# Patient Record
Sex: FEMALE | Race: BLACK OR AFRICAN AMERICAN
[De-identification: names, ages, dates, MRNs, and addresses within clinical notes are randomized per-mention and may not be internally consistent; named-entity substitution may affect disease eponyms.]

---

## 2018-01-07 ENCOUNTER — HOSPITAL ENCOUNTER (EMERGENCY)
Dept: HOSPITAL 61 - ER | Age: 48
Discharge: HOME | End: 2018-01-07
Payer: MEDICARE

## 2018-01-07 DIAGNOSIS — H92.03: ICD-10-CM

## 2018-01-07 DIAGNOSIS — Z98.51: ICD-10-CM

## 2018-01-07 DIAGNOSIS — J02.9: Primary | ICD-10-CM

## 2018-01-07 DIAGNOSIS — Z90.710: ICD-10-CM

## 2018-01-07 PROCEDURE — 87880 STREP A ASSAY W/OPTIC: CPT

## 2018-01-07 PROCEDURE — 96372 THER/PROPH/DIAG INJ SC/IM: CPT

## 2018-01-07 PROCEDURE — 99283 EMERGENCY DEPT VISIT LOW MDM: CPT

## 2018-01-07 PROCEDURE — 87070 CULTURE OTHR SPECIMN AEROBIC: CPT

## 2018-01-07 RX ADMIN — PENICILLIN G BENZATHINE 1 UNIT: 1200000 INJECTION, SUSPENSION INTRAMUSCULAR at 18:15

## 2018-01-08 LAB
NEGATIVE OBC STREP: (no result)
POSITIVE OBC STREP: (no result)

## 2020-06-01 ENCOUNTER — HOSPITAL ENCOUNTER (EMERGENCY)
Dept: HOSPITAL 61 - ER | Age: 50
Discharge: HOME | End: 2020-06-01
Payer: MEDICARE

## 2020-06-01 VITALS — HEIGHT: 68 IN | BODY MASS INDEX: 37.96 KG/M2 | WEIGHT: 250.45 LBS

## 2020-06-01 VITALS — DIASTOLIC BLOOD PRESSURE: 72 MMHG | SYSTOLIC BLOOD PRESSURE: 149 MMHG

## 2020-06-01 DIAGNOSIS — I10: ICD-10-CM

## 2020-06-01 DIAGNOSIS — R07.1: Primary | ICD-10-CM

## 2020-06-01 DIAGNOSIS — E11.9: ICD-10-CM

## 2020-06-01 LAB
ALBUMIN SERPL-MCNC: 3.1 G/DL (ref 3.4–5)
ALBUMIN/GLOB SERPL: 0.7 {RATIO} (ref 1–1.7)
ALP SERPL-CCNC: 83 U/L (ref 46–116)
ALT SERPL-CCNC: 18 U/L (ref 14–59)
AMPHETAMINE/METHAMPHETAMINE: (no result)
ANION GAP SERPL CALC-SCNC: 8 MMOL/L (ref 6–14)
APTT PPP: YELLOW S
AST SERPL-CCNC: 7 U/L (ref 15–37)
BACTERIA #/AREA URNS HPF: (no result) /HPF
BARBITURATES UR-MCNC: (no result) UG/ML
BASOPHILS # BLD AUTO: 0 X10^3/UL (ref 0–0.2)
BASOPHILS NFR BLD: 0 % (ref 0–3)
BENZODIAZ UR-MCNC: (no result) UG/L
BILIRUB SERPL-MCNC: 0.2 MG/DL (ref 0.2–1)
BILIRUB UR QL STRIP: NEGATIVE
BUN SERPL-MCNC: 10 MG/DL (ref 7–20)
BUN/CREAT SERPL: 13 (ref 6–20)
CALCIUM SERPL-MCNC: 8.7 MG/DL (ref 8.5–10.1)
CANNABINOIDS UR-MCNC: (no result) UG/L
CHLORIDE SERPL-SCNC: 99 MMOL/L (ref 98–107)
CO2 SERPL-SCNC: 28 MMOL/L (ref 21–32)
COCAINE UR-MCNC: (no result) NG/ML
CREAT SERPL-MCNC: 0.8 MG/DL (ref 0.6–1)
EOSINOPHIL NFR BLD: 0.1 X10^3/UL (ref 0–0.7)
EOSINOPHIL NFR BLD: 1 % (ref 0–3)
ERYTHROCYTE [DISTWIDTH] IN BLOOD BY AUTOMATED COUNT: 14.4 % (ref 11.5–14.5)
FIBRINOGEN PPP-MCNC: CLEAR MG/DL
GFR SERPLBLD BASED ON 1.73 SQ M-ARVRAT: 92.6 ML/MIN
GLOBULIN SER-MCNC: 4.3 G/DL (ref 2.2–3.8)
GLUCOSE SERPL-MCNC: 335 MG/DL (ref 70–99)
HCT VFR BLD CALC: 40.3 % (ref 36–47)
HGB BLD-MCNC: 13.6 G/DL (ref 12–15.5)
LIPASE: 49 U/L (ref 73–393)
LYMPHOCYTES # BLD: 1.4 X10^3/UL (ref 1–4.8)
LYMPHOCYTES NFR BLD AUTO: 14 % (ref 24–48)
MCH RBC QN AUTO: 29 PG (ref 25–35)
MCHC RBC AUTO-ENTMCNC: 34 G/DL (ref 31–37)
MCV RBC AUTO: 86 FL (ref 79–100)
METHADONE SERPL-MCNC: (no result) NG/ML
MONO #: 0.6 X10^3/UL (ref 0–1.1)
MONOCYTES NFR BLD: 6 % (ref 0–9)
NEUT #: 7.6 X10^3/UL (ref 1.8–7.7)
NEUTROPHILS NFR BLD AUTO: 78 % (ref 31–73)
NITRITE UR QL STRIP: NEGATIVE
OPIATES UR-MCNC: (no result) NG/ML
PCP SERPL-MCNC: (no result) MG/DL
PH UR STRIP: 7 [PH]
PLATELET # BLD AUTO: 222 X10^3/UL (ref 140–400)
POTASSIUM SERPL-SCNC: 4 MMOL/L (ref 3.5–5.1)
PROT SERPL-MCNC: 7.4 G/DL (ref 6.4–8.2)
PROT UR STRIP-MCNC: NEGATIVE MG/DL
PROTHROMBIN TIME: 11.8 SEC (ref 11.7–14)
RBC # BLD AUTO: 4.69 X10^6/UL (ref 3.5–5.4)
RBC #/AREA URNS HPF: 0 /HPF (ref 0–2)
SODIUM SERPL-SCNC: 135 MMOL/L (ref 136–145)
SQUAMOUS #/AREA URNS LPF: (no result) /LPF
UROBILINOGEN UR-MCNC: 1 MG/DL
WBC # BLD AUTO: 9.8 X10^3/UL (ref 4–11)
WBC #/AREA URNS HPF: (no result) /HPF (ref 0–4)

## 2020-06-01 PROCEDURE — 83690 ASSAY OF LIPASE: CPT

## 2020-06-01 PROCEDURE — 83880 ASSAY OF NATRIURETIC PEPTIDE: CPT

## 2020-06-01 PROCEDURE — 80307 DRUG TEST PRSMV CHEM ANLYZR: CPT

## 2020-06-01 PROCEDURE — 99285 EMERGENCY DEPT VISIT HI MDM: CPT

## 2020-06-01 PROCEDURE — 84484 ASSAY OF TROPONIN QUANT: CPT

## 2020-06-01 PROCEDURE — 80053 COMPREHEN METABOLIC PANEL: CPT

## 2020-06-01 PROCEDURE — 93005 ELECTROCARDIOGRAM TRACING: CPT

## 2020-06-01 PROCEDURE — 85379 FIBRIN DEGRADATION QUANT: CPT

## 2020-06-01 PROCEDURE — 85025 COMPLETE CBC W/AUTO DIFF WBC: CPT

## 2020-06-01 PROCEDURE — 81001 URINALYSIS AUTO W/SCOPE: CPT

## 2020-06-01 PROCEDURE — 87086 URINE CULTURE/COLONY COUNT: CPT

## 2020-06-01 PROCEDURE — 85610 PROTHROMBIN TIME: CPT

## 2020-06-01 PROCEDURE — 36415 COLL VENOUS BLD VENIPUNCTURE: CPT

## 2020-06-01 PROCEDURE — 71046 X-RAY EXAM CHEST 2 VIEWS: CPT

## 2020-06-01 PROCEDURE — 96374 THER/PROPH/DIAG INJ IV PUSH: CPT

## 2020-06-01 NOTE — EKG
[facility]

              [address] [place] [address]

Test Date:    2020               Test Time:    15:36:30

Pat Name:     THERESA HERANNDEZ     Department:   

Patient ID:   [id number]           Room:          

Gender:       F                        Technician:   

:          1970               Requested By: CASSIDY ESCAMILLA

Order Number: [id number]           Reading MD:   Dhruv Prado

                                 Measurements

Intervals                              Axis          

Rate:         87                       P:            39

WY:           154                      QRS:          -1

QRSD:         88                       T:            23

QT:           346                                    

QTc:          422                                    

                           Interpretive Statements

SINUS RHYTHM

LEFT ATRIAL ABNORMALITY

LEFTWARD AXIS

ABNORMAL ECG



Electronically Signed On 2020 12:03:12 CDT by Dhruv Prado

## 2020-06-01 NOTE — RAD
EXAM: CHEST PA   LATERAL

 

INDICATION: Reason: chest pain / Spl. Instructions:  / History: .

 

TECHNIQUE: PA and lateral views

 

COMPARISON: 6/10/2014 chest x-ray

 

FINDINGS:

 

The heart size is normal.

 

The great vessels appear unremarkable.

 

There is no hilar or mediastinal mass.

 

The lungs are clear.

 

There is no pleural effusion or pneumothorax.

 

There are no significant osseous abnormalities.

 

IMPRESSION:

 

No active cardiopulmonary disease.

 

Electronically signed by: Jefry Sweeney MD (6/1/2020 5:26 PM) 

[id number]

## 2020-06-01 NOTE — PHYS DOC
Past Medical History


Past Medical History:  Diabetes-Type II, Hypertension, Other


Additional Past Medical Histor:  Detached retina; Visually impaired, SLEEP APNEA


Past Surgical History:  Hysterectomy, Tubal ligation, Other


Additional Past Surgical Histo:  Detached retina repair; Cauterization for 

vaginal bleeding, LEFT FOOT


Smoking Status:  Never Smoker


Alcohol Use:  Occasionally


Drug Use:  None





General Adult


EDM:


Chief Complaint:  CHEST PAIN





HPI:


HPI:





Patient is a 50  year old FEMALE who presents with recent travel to [place] and 

she has been lifting luggage and lifting grandchildren holding grandchildren.  

Saturday she began having mid to left-sided chest pain that is more so with 

movement or laughing or taking a deep breath.  She rates her pain a 5 out of 10.

 She states that the pain will get worse and then gets better.  She states she 

took a 325 Mark aspirin at 1300 today.  Patient has a history of diabetes, 

sleep apnea, hypertension, hysterectomy, detached retina.





Review of Systems:


Review of Systems:





Cardiovascular:   chest pain or denies edema. []





Heart Score:


HEART Score for Chest Pain:  








HEART Score for Chest Pain Response (Comments) Value


 


History Slighlty/Non-Suspicious 0


 


ECG Normal 0


 


Age >45 - < 65 1


 


Risk Factors                            1 or 2 Risk Factors 1


 


Troponin < Normal Limit 0


 


Total  2








Risk Factors:


Risk Factors:  DM, Current or recent (<one month) smoker, HTN, HLP, family 

history of CAD, obesity.


Risk Scores:


Score 0 - 3:  2.5% MACE over next 6 weeks - Discharge Home


Score 4 - 6:  20.3% MACE over next 6 weeks - Admit for Clinical Observation


Score 7 - 10:  72.7% MACE over next 6 weeks - Early Invasive Strategies





Current Medications:





Current Medications








 Medications


  (Trade)  Dose


 Ordered  Sig/[facility]  Start Time


 Stop Time Status Last Admin


Dose Admin


 


 Fentanyl Citrate


  (Fentanyl 2ml


 Vial)  50 mcg  1X  ONCE  20 16:45


 20 16:46 DC 20 16:47


50 MCG











Allergies:


Allergies:





Allergies








Coded Allergies Type Severity Reaction Last Updated Verified


 


  No Known Drug Allergies    6/10/14 No











Physical Exam:


PE:





Constitutional: Well developed, well nourished, no acute distress, non-toxic 

appearance. []


HENT: Normocephalic, atraumatic, bilateral external ears normal, oropharynx 

moist, no oral exudates, nose normal. []


Eyes: PERRLA, EOMI, conjunctiva normal, no discharge. [] 


Neck: Normal range of motion, no tenderness, supple, no stridor. [] 


Cardiovascular:Heart rate regular rhythm, no murmur, reproducible chest pain 

with movement of left arm, laughing.  []


Lungs & Thorax:  Bilateral breath sounds clear to auscultation []


Abdomen: Bowel sounds normal, soft, no tenderness, no masses, no pulsatile 

masses. [] 


Skin: Warm, dry, no erythema, no rash. [] 


Back: No tenderness, no CVA tenderness. [] 


Extremities: No tenderness, no cyanosis, no clubbing, ROM intact, no edema. [] 


Neurologic: Alert and oriented X 3, normal motor function, normal sensory 

function, no focal deficits noted. []


Psychologic: Affect normal, judgement normal, mood normal. []





Current Patient Data:


Labs:





                                Laboratory Tests








Test


 20


15:30 20


15:48 20


16:53


 


Urine Collection Type Void    


 


Urine Color Yellow    


 


Urine Clarity Clear    


 


Urine pH


 7.0 (<5.0-8.0)


 


 





 


Urine Specific Gravity


 >=1.030


(1.000-1.030) 


 





 


Urine Protein


 Negative mg/dL


(NEG-TRACE) 


 





 


Urine Glucose (UA)


 >=1000 mg/dL


(NEG) 


 





 


Urine Ketones (Stick)


 Negative mg/dL


(NEG) 


 





 


Urine Blood


 Negative (NEG)


 


 





 


Urine Nitrite


 Negative (NEG)


 


 





 


Urine Bilirubin


 Negative (NEG)


 


 





 


Urine Urobilinogen Dipstick


 1.0 mg/dL (0.2


mg/dL) 


 





 


Urine Leukocyte Esterase Small (NEG)    


 


Urine RBC 0 /HPF (0-2)    


 


Urine WBC


 1-4 /HPF (0-4)


 


 





 


Urine Squamous Epithelial


Cells Mod /LPF  


 


 





 


Urine Bacteria


 Few /HPF


(0-FEW) 


 





 


Urine Opiates Screen Neg (NEG)    


 


Urine Methadone Screen Neg (NEG)    


 


Urine Barbiturates Neg (NEG)    


 


Urine Phencyclidine Screen Neg (NEG)    


 


Urine


Amphetamine/Methamphetamine Neg (NEG)  


 


 





 


Urine Benzodiazepines Screen Neg (NEG)    


 


Urine Cocaine Screen Neg (NEG)    


 


Urine Cannabinoids Screen Neg (NEG)    


 


Urine Ethyl Alcohol Neg (NEG)    


 


White Blood Count


 


 9.8 x10^3/uL


(4.0-11.0) 





 


Red Blood Count


 


 4.69 x10^6/uL


(3.50-5.40) 





 


Hemoglobin


 


 13.6 g/dL


(12.0-15.5) 





 


Hematocrit


 


 40.3 %


(36.0-47.0) 





 


Mean Corpuscular Volume


 


 86 fL ()


 





 


Mean Corpuscular Hemoglobin  29 pg (25-35)   


 


Mean Corpuscular Hemoglobin


Concent 


 34 g/dL


(31-37) 





 


Red Cell Distribution Width


 


 14.4 %


(11.5-14.5) 





 


Platelet Count


 


 222 x10^3/uL


(140-400) 





 


Neutrophils (%) (Auto)  78 % (31-73)  H 


 


Lymphocytes (%) (Auto)  14 % (24-48)  L 


 


Monocytes (%) (Auto)  6 % (0-9)   


 


Eosinophils (%) (Auto)  1 % (0-3)   


 


Basophils (%) (Auto)  0 % (0-3)   


 


Neutrophils # (Auto)


 


 7.6 x10^3/uL


(1.8-7.7) 





 


Lymphocytes # (Auto)


 


 1.4 x10^3/uL


(1.0-4.8) 





 


Monocytes # (Auto)


 


 0.6 x10^3/uL


(0.0-1.1) 





 


Eosinophils # (Auto)


 


 0.1 x10^3/uL


(0.0-0.7) 





 


Basophils # (Auto)


 


 0.0 x10^3/uL


(0.0-0.2) 





 


Prothrombin Time


 


 


 11.8 SEC


(11.7-14.0)


 


Prothrombin Time INR   0.9 (0.8-1.1)  


 


D-Dimer (Taisha)


 


 


 0.43 ug/mlFEU


(0.00-0.50)


 


Sodium Level


 


 


 135 mmol/L


(136-145)  L


 


Potassium Level


 


 


 4.0 mmol/L


(3.5-5.1)


 


Chloride Level


 


 


 99 mmol/L


()


 


Carbon Dioxide Level


 


 


 28 mmol/L


(21-32)


 


Anion Gap   8 (6-14)  


 


Blood Urea Nitrogen


 


 


 10 mg/dL


(7-20)


 


Creatinine


 


 


 0.8 mg/dL


(0.6-1.0)


 


Estimated GFR


(Cockcroft-Gault) 


 


 92.6  





 


BUN/Creatinine Ratio   13 (6-20)  


 


Glucose Level


 


 


 335 mg/dL


(70-99)  H


 


Calcium Level


 


 


 8.7 mg/dL


(8.5-10.1)


 


Total Bilirubin


 


 


 0.2 mg/dL


(0.2-1.0)


 


Aspartate Amino Transferase


(AST) 


 


 7 U/L (15-37)


L


 


Alanine Aminotransferase (ALT)


 


 


 18 U/L (14-59)





 


Alkaline Phosphatase


 


 


 83 U/L


()


 


Troponin I Quantitative


 


 


 < 0.017 ng/mL


(0.000-0.055)


 


NT-Pro-B-Type Natriuretic


Peptide 


 


 73 pg/mL


(0-124)


 


Total Protein


 


 


 7.4 g/dL


(6.4-8.2)


 


Albumin


 


 


 3.1 g/dL


(3.4-5.0)  L


 


Albumin/Globulin Ratio


 


 


 0.7 (1.0-1.7)


L


 


Lipase


 


 


 49 U/L


()  L





                                Laboratory Tests


20 15:48








                                Laboratory Tests


20 16:53








Vital Signs:





                                   Vital Signs








  Date Time  Temp Pulse Resp B/P (MAP) Pulse Ox O2 Delivery O2 Flow Rate FiO2


 


20 16:47   18  99 Room Air  


 


20 15:32 98.2 91  144/76 (98)    





 98.2       











EKG:


EKG:


[]





Radiology/Procedures:


Radiology/Procedures:


[]


Impression:


                            [facility]


                    [address] [place] [address]


                                 [phone]


                                        


                                 IMAGING REPORT





                                     Signed





PATIENT: THERESA HERNANDEZ LACCOUNT: [id number]     MRN#: [id number]


: 1970           LOCATION: ER              AGE: 50


SEX: F                    EXAM DT: 20         ACCESSION#: [id number]


STATUS: REG ER            ORD. PHYSICIAN: CASSIDY ESCAMILLA


REASON: chest pain


PROCEDURE: CHEST PA & LATERAL





EXAM: CHEST PA   LATERAL


 


INDICATION: Reason: chest pain / Spl. Instructions:  / History: .


 


TECHNIQUE: PA and lateral views


 


COMPARISON: 6/10/2014 chest x-ray


 


FINDINGS:


 


The heart size is normal.


 


The great vessels appear unremarkable.


 


There is no hilar or mediastinal mass.


 


The lungs are clear.


 


There is no pleural effusion or pneumothorax.


 


There are no significant osseous abnormalities.


 


IMPRESSION:


 


No active cardiopulmonary disease.


 


Electronically signed by: Germaine Sweeney MD (2020 5:26 PM) 


[id number]














DICTATED and SIGNED BY:     GERMAINE SWEENEY MD


DATE:     20 1726





Course & Med Decision Making:


Course & Med Decision Making


Pertinent Labs and Imaging studies reviewed. (See chart for details)





No extremity swelling.  Alert and oriented.  Lungs are clear to auscultation all

 lobes.  Vital signs within normal limits.  EKG is sinus rhythm and no STEMI.  

Speaks in full clear sentences.  Ambulatory with a steady gait.  Denies any 

numbness or tingling, headache, dizziness, syncope, palpitations, shortness of 

breath, cough, fever, abdominal pain, nausea, vomiting, diarrhea, focal 

weakness, vision changes.  She denies been rounding by also is been sick.  Heart

 score is a 2.





Chest x-ray shows no acute findings.  Lab work shows no acute findings.  Due to 

the reproducible pain with movement and laughing this is most likely 

musculoskeletal pain. Patient states pain is good after Fentanyl given. 





[]





Dragon Disclaimer:


Dragon Disclaimer:


This electronic medical record was generated, in whole or in part, using a voice

 recognition dictation system.





Departure


Departure


Impression:  


   Primary Impression:  


   Costochondral chest pain


Disposition:   HOME, SELF-CARE


Condition:  STABLE


Referrals:  


ARUN DANIELLE MD (PCP)








SATISH PERDOMO MD


Patient Instructions:  Costochondritis, Easy-to-Read





Additional Instructions:  


Take medication as prescribed.  Follow-up with a cardiologist if needed.  If 

pain becomes severe he becomes very short of breath come back to the emergency 

room.


Scripts


Orphenadrine Citrate (ORPHENADRINE CITRATE) 100 Mg Tablet.er


1 TAB PO BID, #14 TAB


   Prov: CASSIDY ESCAMILLA APRN         20 


Hydrocodone/Apap 5-325 (NORCO 5-325 TABLET) 1 Each Tablet


1 TAB PO PRN Q6HRS PRN for PAIN, #8 TAB 0 Refills


   Prov: CASSIDY ESCAMILLA         20











CASSIDY ESCAMILLA             2020 18:08

## 2020-06-22 ENCOUNTER — HOSPITAL ENCOUNTER (EMERGENCY)
Dept: HOSPITAL 61 - ER | Age: 50
Discharge: HOME | End: 2020-06-22
Payer: MEDICARE

## 2020-06-22 VITALS — SYSTOLIC BLOOD PRESSURE: 198 MMHG | DIASTOLIC BLOOD PRESSURE: 94 MMHG

## 2020-06-22 VITALS — HEIGHT: 68 IN | BODY MASS INDEX: 37.96 KG/M2 | WEIGHT: 250.45 LBS

## 2020-06-22 DIAGNOSIS — E11.9: ICD-10-CM

## 2020-06-22 DIAGNOSIS — Z98.51: ICD-10-CM

## 2020-06-22 DIAGNOSIS — I10: ICD-10-CM

## 2020-06-22 DIAGNOSIS — N39.0: Primary | ICD-10-CM

## 2020-06-22 DIAGNOSIS — Z90.710: ICD-10-CM

## 2020-06-22 LAB
APTT PPP: YELLOW S
BACTERIA #/AREA URNS HPF: (no result) /HPF
BILIRUB UR QL STRIP: NEGATIVE
FIBRINOGEN PPP-MCNC: (no result) MG/DL
NITRITE UR QL STRIP: POSITIVE
PH UR STRIP: 5 [PH]
PROT UR STRIP-MCNC: 30 MG/DL
RBC #/AREA URNS HPF: (no result) /HPF (ref 0–2)
SQUAMOUS #/AREA URNS LPF: (no result) /LPF
UROBILINOGEN UR-MCNC: 0.2 MG/DL
WBC #/AREA URNS HPF: (no result) /HPF (ref 0–4)
YEAST #/AREA URNS HPF: PRESENT /HPF

## 2020-06-22 PROCEDURE — 81001 URINALYSIS AUTO W/SCOPE: CPT

## 2020-06-22 PROCEDURE — 99283 EMERGENCY DEPT VISIT LOW MDM: CPT

## 2020-06-22 PROCEDURE — 87086 URINE CULTURE/COLONY COUNT: CPT

## 2020-06-22 NOTE — PHYS DOC
Past Medical History


Past Medical History:  Diabetes-Type II, Hypertension, Other


Additional Past Medical Histor:  Detached retina; Visually impaired, SLEEP APNEA


Past Surgical History:  Hysterectomy, Tubal ligation, Other


Additional Past Surgical Histo:  Detached retina repair; Cauterization for 

vaginal bleeding, LEFT FOOT


Smoking Status:  Never Smoker


Alcohol Use:  Occasionally


Drug Use:  None





General Adult


EDM:


Chief Complaint:  PAIN ON URINATION





HPI:


HPI:





Patient is a 50  year old female who presents with 2 days of burning with 

urination and low back pain. Denies fever, nausea, vomiting, chest pain, soa, 

dizziness, headache, diarrhea, constipation, abdominal pain. Abdomen is soft and

nontender. Afebrile.





Review of Systems:


Review of Systems:





:  Denies dysuria. []





Heart Score:


Risk Factors:


Risk Factors:  DM, Current or recent (<one month) smoker, HTN, HLP, family 

history of CAD, obesity.


Risk Scores:


Score 0 - 3:  2.5% MACE over next 6 weeks - Discharge Home


Score 4 - 6:  20.3% MACE over next 6 weeks - Admit for Clinical Observation


Score 7 - 10:  72.7% MACE over next 6 weeks - Early Invasive Strategies





Allergies:


Allergies:





Allergies








Coded Allergies Type Severity Reaction Last Updated Verified


 


  No Known Drug Allergies    6/10/14 No











Physical Exam:


PE:





Constitutional: Well developed, well nourished, no acute distress, non-toxic 

appearance. []


HENT: Normocephalic, atraumatic, bilateral external ears normal, oropharynx 

moist, no oral exudates, nose normal. []


Eyes: PERRLA, EOMI, conjunctiva normal, no discharge. [] 


Neck: Normal range of motion, no tenderness, supple, no stridor. [] 


Cardiovascular:Heart rate regular rhythm, no murmur []


Lungs & Thorax:  Bilateral breath sounds clear to auscultation []


Abdomen: Bowel sounds normal, soft, no tenderness, no masses, no pulsatile 

masses. [] 


Skin: Warm, dry, no erythema, no rash. [] 


Back: No tenderness, no CVA tenderness. [] 


Extremities: No tenderness, no cyanosis, no clubbing, ROM intact, no edema. [] 


Neurologic: Alert and oriented X 3, normal motor function, normal sensory 

function, no focal deficits noted. []


Psychologic: Affect normal, judgement normal, mood normal. 





**Normal physical exam []





Current Patient Data:


Vital Signs:





                                   Vital Signs








  Date Time  Temp Pulse Resp B/P (MAP) Pulse Ox O2 Delivery O2 Flow Rate FiO2


 


6/22/20 20:58 98.2 84 18 198/94 (128) 98 Room Air  





 98.2       











EKG:


EKG:


[]





Radiology/Procedures:


Radiology/Procedures:


[]





Course & Med Decision Making:


Course & Med Decision Making


Pertinent Labs and Imaging studies reviewed. (See chart for details)





Abdomen is soft and nontender.  Alert and oriented.  Skin is pink warm and dry. 

 Afebrile.  Speaks in full clear sentences.  Ambulatory with steady gait.  

Denies abdominal pain.  No CVA tenderness.





Patient has a urinary tract infection.  Patient only given an antibiotic and 

follow-up with primary care provider.


[]





Dragon Disclaimer:


Dragon Disclaimer:


This electronic medical record was generated, in whole or in part, using a voice

 recognition dictation system.





Departure


Departure


Impression:  


   Primary Impression:  


   Urinary tract infection


   Qualified Codes:  N39.0 - Urinary tract infection, site not specified


Disposition:  01 HOME, SELF-CARE


Condition:  STABLE


Referrals:  


ARUN DANIELLE MD (PCP)


Patient Instructions:  Urinary Tract Infection





Additional Instructions:  


Take medication with food and as prescribed.  Drink plenty of fluids.  Follow-up

 with your primary care provider.


Scripts


Cephalexin (KEFLEX) 500 Mg Capsule


1 CAP PO BID for 7 Days, #14 CAP 0 Refills


   Prov: CASSIDY ESCAMILLA         6/22/20





Justicifation of Admission Dx:


Justifications for Admission:


Justification of Admission Dx:  N/A











CASSIDY ESCAMILLA APRN            Jun 22, 2020 21:13

## 2021-04-26 ENCOUNTER — HOSPITAL ENCOUNTER (EMERGENCY)
Dept: HOSPITAL 61 - ER | Age: 51
Discharge: HOME | End: 2021-04-26
Payer: MEDICARE

## 2021-04-26 VITALS — SYSTOLIC BLOOD PRESSURE: 154 MMHG | DIASTOLIC BLOOD PRESSURE: 75 MMHG

## 2021-04-26 VITALS — BODY MASS INDEX: 39.46 KG/M2 | HEIGHT: 68 IN | WEIGHT: 260.37 LBS

## 2021-04-26 DIAGNOSIS — J06.9: ICD-10-CM

## 2021-04-26 DIAGNOSIS — R20.8: ICD-10-CM

## 2021-04-26 DIAGNOSIS — R06.02: ICD-10-CM

## 2021-04-26 DIAGNOSIS — U07.1: Primary | ICD-10-CM

## 2021-04-26 DIAGNOSIS — J30.9: ICD-10-CM

## 2021-04-26 LAB
ALBUMIN SERPL-MCNC: 3.4 G/DL (ref 3.4–5)
ALBUMIN/GLOB SERPL: 0.7 {RATIO} (ref 1–1.7)
ALP SERPL-CCNC: 88 U/L (ref 46–116)
ALT SERPL-CCNC: 14 U/L (ref 14–59)
AMORPH SED URNS QL MICRO: PRESENT /HPF
ANION GAP SERPL CALC-SCNC: 6 MMOL/L (ref 6–14)
APTT PPP: (no result) S
AST SERPL-CCNC: 13 U/L (ref 15–37)
BACTERIA #/AREA URNS HPF: (no result) /HPF
BASOPHILS # BLD AUTO: 0 X10^3/UL (ref 0–0.2)
BASOPHILS NFR BLD: 0 % (ref 0–3)
BILIRUB SERPL-MCNC: 0.6 MG/DL (ref 0.2–1)
BILIRUB UR QL STRIP: (no result)
BUN SERPL-MCNC: 10 MG/DL (ref 7–20)
BUN/CREAT SERPL: 14 (ref 6–20)
CALCIUM SERPL-MCNC: 9 MG/DL (ref 8.5–10.1)
CHLORIDE SERPL-SCNC: 96 MMOL/L (ref 98–107)
CO2 SERPL-SCNC: 34 MMOL/L (ref 21–32)
CREAT SERPL-MCNC: 0.7 MG/DL (ref 0.6–1)
EOSINOPHIL NFR BLD: 0 X10^3/UL (ref 0–0.7)
EOSINOPHIL NFR BLD: 1 % (ref 0–3)
ERYTHROCYTE [DISTWIDTH] IN BLOOD BY AUTOMATED COUNT: 13.5 % (ref 11.5–14.5)
FIBRINOGEN PPP-MCNC: CLEAR MG/DL
GFR SERPLBLD BASED ON 1.73 SQ M-ARVRAT: 108.1 ML/MIN
GLUCOSE SERPL-MCNC: 249 MG/DL (ref 70–99)
HCT VFR BLD CALC: 43.8 % (ref 36–47)
HGB BLD-MCNC: 14.8 G/DL (ref 12–15.5)
HYALINE CASTS #/AREA URNS LPF: (no result) /HPF
LIPASE: 64 U/L (ref 73–393)
LYMPHOCYTES # BLD: 1.7 X10^3/UL (ref 1–4.8)
LYMPHOCYTES NFR BLD AUTO: 32 % (ref 24–48)
MAGNESIUM SERPL-MCNC: 1.8 MG/DL (ref 1.8–2.4)
MCH RBC QN AUTO: 28 PG (ref 25–35)
MCHC RBC AUTO-ENTMCNC: 34 G/DL (ref 31–37)
MCV RBC AUTO: 83 FL (ref 79–100)
MONO #: 0.7 X10^3/UL (ref 0–1.1)
MONOCYTES NFR BLD: 13 % (ref 0–9)
NEUT #: 2.9 X10^3/UL (ref 1.8–7.7)
NEUTROPHILS NFR BLD AUTO: 54 % (ref 31–73)
NITRITE UR QL STRIP: NEGATIVE
PH UR STRIP: 6 [PH]
PLATELET # BLD AUTO: 255 X10^3/UL (ref 140–400)
POTASSIUM SERPL-SCNC: 2.9 MMOL/L (ref 3.5–5.1)
PROT SERPL-MCNC: 8.5 G/DL (ref 6.4–8.2)
PROT UR STRIP-MCNC: 30 MG/DL
RBC # BLD AUTO: 5.28 X10^6/UL (ref 3.5–5.4)
RBC #/AREA URNS HPF: 0 /HPF (ref 0–2)
SODIUM SERPL-SCNC: 136 MMOL/L (ref 136–145)
UROBILINOGEN UR-MCNC: 1 MG/DL
WBC # BLD AUTO: 5.3 X10^3/UL (ref 4–11)
WBC #/AREA URNS HPF: 0 /HPF (ref 0–4)

## 2021-04-26 PROCEDURE — 71045 X-RAY EXAM CHEST 1 VIEW: CPT

## 2021-04-26 PROCEDURE — 99285 EMERGENCY DEPT VISIT HI MDM: CPT

## 2021-04-26 PROCEDURE — 80053 COMPREHEN METABOLIC PANEL: CPT

## 2021-04-26 PROCEDURE — 96361 HYDRATE IV INFUSION ADD-ON: CPT

## 2021-04-26 PROCEDURE — 36415 COLL VENOUS BLD VENIPUNCTURE: CPT

## 2021-04-26 PROCEDURE — 84484 ASSAY OF TROPONIN QUANT: CPT

## 2021-04-26 PROCEDURE — 83690 ASSAY OF LIPASE: CPT

## 2021-04-26 PROCEDURE — U0003 INFECTIOUS AGENT DETECTION BY NUCLEIC ACID (DNA OR RNA); SEVERE ACUTE RESPIRATORY SYNDROME CORONAVIRUS 2 (SARS-COV-2) (CORONAVIRUS DISEASE [COVID-19]), AMPLIFIED PROBE TECHNIQUE, MAKING USE OF HIGH THROUGHPUT TECHNOLOGIES AS DESCRIBED BY CMS-2020-01-R: HCPCS

## 2021-04-26 PROCEDURE — 83735 ASSAY OF MAGNESIUM: CPT

## 2021-04-26 PROCEDURE — 93005 ELECTROCARDIOGRAM TRACING: CPT

## 2021-04-26 PROCEDURE — 85025 COMPLETE CBC W/AUTO DIFF WBC: CPT

## 2021-04-26 PROCEDURE — 96360 HYDRATION IV INFUSION INIT: CPT

## 2021-04-26 PROCEDURE — 87426 SARSCOV CORONAVIRUS AG IA: CPT

## 2021-04-26 PROCEDURE — 81001 URINALYSIS AUTO W/SCOPE: CPT

## 2021-04-26 NOTE — RAD
EXAM: CHEST 1 VIEW 



History: Chest pain 



COMPARISON: None available.



TECHNIQUE: Single portable radiograph of the chest



FINDINGS:  The cardiac silhouette is unremarkable. Minimal left lung base atelectasis or infiltrate. 
The costophrenic sulci are clear and well demarcated.



IMPRESSION:  Minimal left lung base atelectasis or infiltrate.

 



Electronically signed by: Teodoro Angelo MD (4/26/2021 2:44 PM) [id number]

## 2021-04-26 NOTE — EKG
[facility]

              [address] [place] [address]

Test Date:    2021               Test Time:    14:09:39

Pat Name:     THERESA HERNANDEZ     Department:   

Patient ID:   [id number]           Room:          

Gender:       F                        Technician:   

:          1970               Requested By: WILMER FERNANDEZ

Order Number: [id number]           Reading MD:     

                                 Measurements

Intervals                              Axis          

Rate:         91                       P:            19

AK:           158                      QRS:          -15

QRSD:         92                       T:            31

QT:           360                                    

QTc:          450                                    

                           Interpretive Statements

SINUS RHYTHM

LEFTWARD AXIS

OTHERWISE NORMAL ECG

RI6.01

No previous ECG available for comparison

## 2021-04-26 NOTE — ED.ADGEN
Past Medical History


Past Medical History:  Diabetes-Type II, Glaucoma, Hypertension, Other


Additional Past Medical Histor:  Detached retina; Visually impaired, SLEEP APNEA


Past Surgical History:  Hysterectomy, Tubal ligation, Other


Additional Past Surgical Histo:  Detached retina repair; Cauterization for 

vaginal bleeding, LEFT FOOT,


Past Surgical History


Cataracts


Smoking Status:  Never Smoker


Alcohol Use:  Occasionally


Drug Use:  None





General Adult


EDM:


Chief Complaint:  SHORTNESS OF BREATH





HPI:


HPI:





Patient is a 50  year old AA female who presents emergency department with 

complaints of worsening cold symptoms even though she has been on antibiotics 

for the last week.  Patient reports that a week ago she began to have severe si

nus pressure described as a intense burning sensation and a sore throat. She was

seen by her primary care doctor and was prescribed amoxicillin, Flonase, 

Mucinex, and Tessalon Perles.  She has been taking the medications as prescribed

but states that since yesterday she has felt short of breath, fatigue, 

generalized aches, increased dry cough, and nausea.  She denies any vomiting, 

diarrhea, abdominal pain, rash, palpitations, syncope, or dizziness.  Patient 

reports that her chest hurts with coughing and palpation.  She denies any 

wheezing or extremity edema.  Patient denies any known exposure to COVID-19, she

denies being immunized against Covid.  She currently denies any pain.





Review of Systems:


Review of Systems:


Complete ROS is negative unless otherwise noted in HPI.





Current Medications:





Current Medications








 Medications


  (Trade)  Dose


 Ordered  Sig/Anila  Start Time


 Stop Time Status Last Admin


Dose Admin


 


 Dexamethasone


 Sodium Phosphate


  (Decadron)  10 mg  1X  ONCE  21 18:15


 21 18:16 DC 21 18:54


10 MG


 


 Potassium Chloride


  (Klor-Con)  40 meq  1X  ONCE  21 16:30


 21 16:31 DC 21 16:36


40 MEQ


 


 Sodium Chloride  1,000 ml @ 


 1,000 mls/hr  1X  ONCE  21 16:30


 21 17:29 DC 21 16:37


1,000 MLS/HR











Allergies:


Allergies:





Allergies








Coded Allergies Type Severity Reaction Last Updated Verified


 


  No Known Drug Allergies    6/10/14 No











Physical Exam:


PE:


See Above


Constitutional: Well developed, well nourished, no acute distress, non-toxic 

appearance, obese. []


HENT: Normocephalic, atraumatic, bilateral external ears normal, nose normal. []


Eyes: PERRLA, EOMI, conjunctiva normal, no discharge. [] 


Neck: Normal range of motion, no stridor. [] 


Cardiovascular:Heart rate regular rhythm


Lungs & Thorax:  Respirations even and unlabored, no retractions, no respiratory

 distress, speaking full sentences; sternal chest tenderness to palpation 

without crepitus


Abdomen: soft, no tenderness


Skin: Warm, dry, no erythema, no rash. [] 


Extremities: No cyanosis, ROM intact, no edema. [] 


Neurologic: Alert and oriented X 3, normal motor, normal sensory, no focal 

deficits noted. []


Psychologic: Affect normal, judgement normal, mood normal. []





Current Patient Data:


Labs:





                                Laboratory Tests








Test


 21


15:00 21


15:07


 


White Blood Count


 5.3 x10^3/uL


(4.0-11.0) 





 


Red Blood Count


 5.28 x10^6/uL


(3.50-5.40) 





 


Hemoglobin


 14.8 g/dL


(12.0-15.5) 





 


Hematocrit


 43.8 %


(36.0-47.0) 





 


Mean Corpuscular Volume


 83 fL ()


 





 


Mean Corpuscular Hemoglobin 28 pg (25-35)   


 


Mean Corpuscular Hemoglobin


Concent 34 g/dL


(31-37) 





 


Red Cell Distribution Width


 13.5 %


(11.5-14.5) 





 


Platelet Count


 255 x10^3/uL


(140-400) 





 


Neutrophils (%) (Auto) 54 % (31-73)   


 


Lymphocytes (%) (Auto) 32 % (24-48)   


 


Monocytes (%) (Auto) 13 % (0-9)  H 


 


Eosinophils (%) (Auto) 1 % (0-3)   


 


Basophils (%) (Auto) 0 % (0-3)   


 


Neutrophils # (Auto)


 2.9 x10^3/uL


(1.8-7.7) 





 


Lymphocytes # (Auto)


 1.7 x10^3/uL


(1.0-4.8) 





 


Monocytes # (Auto)


 0.7 x10^3/uL


(0.0-1.1) 





 


Eosinophils # (Auto)


 0.0 x10^3/uL


(0.0-0.7) 





 


Basophils # (Auto)


 0.0 x10^3/uL


(0.0-0.2) 





 


Urine Collection Type Unknown   


 


Urine Color Joan   


 


Urine Clarity Clear   


 


Urine pH


 6.0 (<5.0-8.0)


 





 


Urine Specific Gravity


 1.025


(1.000-1.030) 





 


Urine Protein


 30 mg/dL


(NEG-TRACE) 





 


Urine Glucose (UA)


 250 mg/dL


(NEG) 





 


Urine Ketones (Stick)


 Trace mg/dL


(NEG) 





 


Urine Blood


 Negative (NEG)


 





 


Urine Nitrite


 Negative (NEG)


 





 


Urine Bilirubin Small (NEG)   


 


Urine Urobilinogen Dipstick


 1.0 mg/dL (0.2


mg/dL) 





 


Urine Leukocyte Esterase


 Negative (NEG)


 





 


Urine RBC 0 /HPF (0-2)   


 


Urine WBC 0 /HPF (0-4)   


 


Urine Squamous Epithelial


Cells Many /LPF  


 





 


Urine Amorphous Sediment Present /HPF   


 


Urine Bacteria


 Few /HPF


(0-FEW) 





 


Urine Hyaline Casts Few /HPF   


 


Urine Mucus Marked /LPF   


 


Sodium Level


 136 mmol/L


(136-145) 





 


Potassium Level


 2.9 mmol/L


(3.5-5.1)  *L 





 


Chloride Level


 96 mmol/L


()  L 





 


Carbon Dioxide Level


 34 mmol/L


(21-32)  H 





 


Anion Gap 6 (6-14)   


 


Blood Urea Nitrogen


 10 mg/dL


(7-20) 





 


Creatinine


 0.7 mg/dL


(0.6-1.0) 





 


Estimated GFR


(Cockcroft-Gault) 108.1  


 





 


BUN/Creatinine Ratio 14 (6-20)   


 


Glucose Level


 249 mg/dL


(70-99)  H 





 


Calcium Level


 9.0 mg/dL


(8.5-10.1) 





 


Magnesium Level


 1.8 mg/dL


(1.8-2.4) 





 


Total Bilirubin


 0.6 mg/dL


(0.2-1.0) 





 


Aspartate Amino Transferase


(AST) 13 U/L (15-37)


L 





 


Alanine Aminotransferase (ALT)


 14 U/L (14-59)


 





 


Alkaline Phosphatase


 88 U/L


() 





 


Troponin I Quantitative


 < 0.017 ng/mL


(0.000-0.055) 





 


Total Protein


 8.5 g/dL


(6.4-8.2)  H 





 


Albumin


 3.4 g/dL


(3.4-5.0) 





 


Albumin/Globulin Ratio


 0.7 (1.0-1.7)


L 





 


Lipase


 64 U/L


()  L 





 


SARS-CoV-2 Antigen (Rapid)


 


 Negative


(NEGATIVE)





                                Laboratory Tests


21 15:00








                                Laboratory Tests


21 15:00








Vital Signs:





                                   Vital Signs








  Date Time  Temp Pulse Resp B/P (MAP) Pulse Ox O2 Delivery O2 Flow Rate FiO2


 


21 18:54 98.1 95 18 154/75 (101) 98 Room Air  





 98.1       











EKG:


EK-sinus rhythm with leftward axis, rate 91, no STEMI, read by Dr. Medina []





Heart Score:


C/O Chest Pain:  Yes


HEART Score for Chest Pain:  








HEART Score for Chest Pain Response (Comments) Value


 


History Slighlty/Non-Suspicious 0


 


Age >45 - < 65 1


 


Risk Factors >3 Risk Factors or Hx CAD 2


 


Troponin < Normal Limit 0


 


Total  3








Risk Factors:


Risk Factors:  DM, Current or recent (<one month) smoker, HTN, HLP, family 

history of CAD, obesity.


Risk Scores:


Score 0 - 3:  2.5% MACE over next 6 weeks - Discharge Home


Score 4 - 6:  20.3% MACE over next 6 weeks - Admit for Clinical Observation


Score 7 - 10:  72.7% MACE over next 6 weeks - Early Invasive Strategies





Radiology/Procedures:


Radiology/Procedures:


PROCEDURE: CHEST AP ONLY








EXAM: CHEST 1 VIEW 





History: Chest pain 





COMPARISON: None available.





TECHNIQUE: Single portable radiograph of the chest





FINDINGS:  The cardiac silhouette is unremarkable. Minimal left lung base 

atelectasis or infiltrate. The costophrenic sulci are clear and well demarcated.





IMPRESSION:  Minimal left lung base atelectasis or infiltrate.


 []





Course & Med Decision Making:


Course & Med Decision Making


Pertinent Labs and Imaging studies reviewed. (See chart for details)


50-year-old female presents emergency department with complaints of a ongoing 

cold is not better with medications and is worsened over 2 days.


Rapid Covid is negative, CBC is unremarkable; CMP revealed a potassium of 2.9, 

chloride of 96, CO2 of 34, glucose of 249, troponin is not elevated otherwise 

CMP is unremarkable; UA was negative;


Patient was given 2 L normal saline for treatment of dehydration, 10 mg of p.o. 

Decadron, and 40 mEq of p.o. potassium.  EKG did not reveal any acute findings.


Chest x-ray revealed possible small infiltrate in the left lung base.  

Prescription was written for Z-Ramon, patient was encouraged to continue taking 

amoxicillin as prescribed by her PCP.


Recommended follow-up in 1 to 2 days, quarantine instructions provided.  Patient

 to return to the ER if symptoms worsened.





Patient verbalized an understanding of home care, medications, follow-up, and 

return to ED instructions and was in agreement with the plan of care.





COVID-19 CRITERIA:    The patient was evaluated during the global COVID-19 

pandemic, and that diagnosis was suspected/considered upon their initial 

presentation.  Their evaluation, treatment and testing was consistent with 

current guidelines for patients who present with complaints or symptoms that may

 be related to COVID-19.











Dragon Disclaimer:


Dragon Disclaimer:


This electronic medical record was generated, in whole or in part, using a voice

 recognition dictation system.





Departure


Departure


Impression:  


   Primary Impression:  


   Upper respiratory infection


   Additional Impressions:  


   Person under investigation for COVID-19


   Allergic rhinitis


Disposition:   HOME / SELF CARE / HOMELESS


Condition:  STABLE


Referrals:  


ARUN DANIELLE MD (PCP)


Patient Instructions:  Allergic Rhinitis, Upper Respiratory Infection, Adult, 

Easy-to-Read





Additional Instructions:  


Fill the prescription(s) and use as directed. Recommend that you take 10 mg of 

generic Zyrtec (cetirizine) or Claritin at bedtime and use over the counter 

Flonase (fluticasone) nasal spray 2 sprays each nostril once daily in the 

morning. You may take Tylenol or ibuprofen as needed for pain/fever. Increase 

clear fluids. Avoid triggers such as smoke, fragrance, dust, and pollen.  

Continue taking the medications prescribed by your primary care doctor until co

mplete.  Follow-up with your primary care doctor in the next 1 to 2 days, return

 to the ER if symptoms worsen or fever develops.





You have been tested for or diagnosed with COVID-19. It is an infection caused 

by a new type 


of coronavirus. COVID-19 will cause cold-like or mild flu symptoms in most. It 

can cause 


more severe symptoms like problems breathing in some.





There is no treatment for COVID-19. The body will clear the infection over time.

 Self-care 


will help to ease discomfort.





Steps to Take:


Self-Care


Rest as needed. Healthy habits may help you feel better. Steps include:





Choose healthy foods including fruits and vegetables. Drink water throughout the

 day.


Get plenty of sleep each night.


If you smoke, try to quit. It may ease breathing.


Avoid alcohol.


Keep Others Healthy


The virus can spread to others. Droplets are released every time you sneeze or 

cough. The 


droplets can get into the mouth, nose, or eyes of people near you and lead to 

infection. To 


lower the chances of spreading COVID-19 to others:





Stay at home until your doctor has said it is safe to leave. If you tested 

positive this 


will mean staying isolated until both of the following are true:





At least 7 days have passed since the start of illness.


You are free of fever for at least 72 hours without the use of medicine.


During this time:





 - Avoid public areas, events, or transportation. Do not return to work or 

school until your 


doctor has said it is safe to do so.


 - Call ahead if you need to go to a medical center. Let them know you may have 

COVID-19. It 


will help them guide you where to go. They may also ask you to wear a facemask 

when you come 


to the office.


 - If you call for emergency medical services, let them know you may have COVID-

19.


While at home:





 - Try to avoid close contact with others. Stay about 6 feet away.


 - If possible, spend most of your time in a separate room from others.


 - Use a face mask if you will be in close contact with others such as sharing a

 room or 


vehicle.


 - Have someone wipe down common surfaces in the home. Use household  

every day on 


areas like doorknobs, counters, or sinks.


 - Cough or sneeze into a tissue. Throw the tissue away right after use. If a 

tissue is not 


available, cough or sneeze into your elbow.


 - Wash your hands often. Wash them after sneezing or coughing. Use soap and 

water and wash 


for at least 20 seconds. Alcohol based hand  can be used if soap and 

water is not 


available.


 - Do not prepare food for others. Avoid sharing personal items like forks, 

spoons, or 


toothbrushes.


 - Avoid close contact with pets while you are sick. There is no evidence of the

 virus 


passing to pets. This is a safety step until more is known about this virus.


Isolation can be frustrating. Social interaction can help. Keep in touch with 

friends and 


family through phone and tech options. You can still interact with others in 

your home, just 


keep a safe distance of about 6 feet.





Follow-up:


Your doctors office will check in with you to see if there are any changes in 

your health. 


You may be asked to keep track of symptoms to share with them. They will also 

let you know 


when you are clear to be in public again.





Problems to Look Out For:


Contact your doctor if your recovery is not going as you expect. Get emergency 

care if you 


have problems such as:





 - Trouble breathing


 - Nonstop chest pain or pressure


 - Changes in awareness, confusion, or problems waking


 - Lips or face have bluish color


 - Worsening of symptoms


If you think you have an emergency, call for emergency medical services right 

away.





As taken from [facility]


Scripts


Azithromycin (AZITHROMYCIN TABLET) 250 Mg Tablet


1 PKG PO UD for 5 Days, #6 TAB 0 Refills


   2 the first day followed by 1 for days 2-5


   Prov: WILMER FERNANDEZ         21 


Cetirizine Hcl (CETIRIZINE HCL) 10 Mg Tablet


1 TAB PO HS for 30 Days, #30 TAB 1 Refill


   Prov: WILMER FERNANDEZ         21





COVID-19 Assessment:


COVID-19 Patient Risks:


Age 65 or older:  No


Sign of co-morbidity:  No


Exp to person + for COVID:  No


Exp to PUI:  No


Travel from affected area:  No


Lower respiratory symptoms:  Yes


Fever:  No





PPE Use:


Full PPE with N95 mask or PAPR:  Yes





Attending Signature


Attending Signature


I have participated in the care of this patient and I have reviewed and agree 

with all pertinent clinical information above including history, exam, and 

recommendations.





Problem Qualifiers








   Primary Impression:  


   Upper respiratory infection


   URI type:  unspecified URI  Qualified Codes:  J06.9 - Acute upper respiratory

    infection, unspecified


   Additional Impressions:  


   Allergic rhinitis


   Allergic rhinitis trigger:  unspecified  Allergic rhinitis seasonality:  

   unspecified  Qualified Codes:  J30.9 - Allergic rhinitis, unspecified








WILMER FERNANDEZ       2021 14:36


SLOAN MEDINA MD              2021 06:15

## 2021-04-27 NOTE — NUR
IP: Informed pt of positive COVID test and the need to quarantine for 10 days. Pt verbalized 
understanding.

## 2021-05-03 ENCOUNTER — HOSPITAL ENCOUNTER (EMERGENCY)
Dept: HOSPITAL 61 - ER | Age: 51
Discharge: HOME | End: 2021-05-03
Payer: MEDICARE

## 2021-05-03 VITALS — WEIGHT: 260.15 LBS | BODY MASS INDEX: 39.43 KG/M2 | HEIGHT: 68 IN

## 2021-05-03 VITALS — SYSTOLIC BLOOD PRESSURE: 150 MMHG | DIASTOLIC BLOOD PRESSURE: 74 MMHG

## 2021-05-03 DIAGNOSIS — E11.65: ICD-10-CM

## 2021-05-03 DIAGNOSIS — R06.02: Primary | ICD-10-CM

## 2021-05-03 DIAGNOSIS — E11.39: ICD-10-CM

## 2021-05-03 DIAGNOSIS — E86.0: ICD-10-CM

## 2021-05-03 DIAGNOSIS — I10: ICD-10-CM

## 2021-05-03 LAB
ALBUMIN SERPL-MCNC: 3.4 G/DL (ref 3.4–5)
ALBUMIN/GLOB SERPL: 0.8 {RATIO} (ref 1–1.7)
ALP SERPL-CCNC: 84 U/L (ref 46–116)
ALT SERPL-CCNC: 23 U/L (ref 14–59)
ANION GAP SERPL CALC-SCNC: 5 MMOL/L (ref 6–14)
APTT PPP: YELLOW S
AST SERPL-CCNC: 12 U/L (ref 15–37)
BACTERIA #/AREA URNS HPF: 0 /HPF
BASOPHILS # BLD AUTO: 0.1 X10^3/UL (ref 0–0.2)
BASOPHILS NFR BLD: 1 % (ref 0–3)
BILIRUB SERPL-MCNC: 0.5 MG/DL (ref 0.2–1)
BILIRUB UR QL STRIP: NEGATIVE
BUN SERPL-MCNC: 12 MG/DL (ref 7–20)
BUN/CREAT SERPL: 15 (ref 6–20)
CALCIUM SERPL-MCNC: 9.6 MG/DL (ref 8.5–10.1)
CHLORIDE SERPL-SCNC: 97 MMOL/L (ref 98–107)
CO2 SERPL-SCNC: 32 MMOL/L (ref 21–32)
CREAT SERPL-MCNC: 0.8 MG/DL (ref 0.6–1)
EOSINOPHIL NFR BLD: 0.1 X10^3/UL (ref 0–0.7)
EOSINOPHIL NFR BLD: 1 % (ref 0–3)
ERYTHROCYTE [DISTWIDTH] IN BLOOD BY AUTOMATED COUNT: 13.5 % (ref 11.5–14.5)
FIBRINOGEN PPP-MCNC: CLEAR MG/DL
GFR SERPLBLD BASED ON 1.73 SQ M-ARVRAT: 92.6 ML/MIN
GLUCOSE SERPL-MCNC: 385 MG/DL (ref 70–99)
HCT VFR BLD CALC: 43.8 % (ref 36–47)
HGB BLD-MCNC: 14.4 G/DL (ref 12–15.5)
LYMPHOCYTES # BLD: 2.1 X10^3/UL (ref 1–4.8)
LYMPHOCYTES NFR BLD AUTO: 16 % (ref 24–48)
MCH RBC QN AUTO: 28 PG (ref 25–35)
MCHC RBC AUTO-ENTMCNC: 33 G/DL (ref 31–37)
MCV RBC AUTO: 84 FL (ref 79–100)
MONO #: 0.9 X10^3/UL (ref 0–1.1)
MONOCYTES NFR BLD: 6 % (ref 0–9)
NEUT #: 10.5 X10^3/UL (ref 1.8–7.7)
NEUTROPHILS NFR BLD AUTO: 77 % (ref 31–73)
NITRITE UR QL STRIP: NEGATIVE
PH UR STRIP: 7 [PH]
PLATELET # BLD AUTO: 308 X10^3/UL (ref 140–400)
POTASSIUM SERPL-SCNC: 4.2 MMOL/L (ref 3.5–5.1)
PROT SERPL-MCNC: 7.8 G/DL (ref 6.4–8.2)
PROT UR STRIP-MCNC: NEGATIVE MG/DL
RBC # BLD AUTO: 5.19 X10^6/UL (ref 3.5–5.4)
RBC #/AREA URNS HPF: 0 /HPF (ref 0–2)
SODIUM SERPL-SCNC: 134 MMOL/L (ref 136–145)
UROBILINOGEN UR-MCNC: 1 MG/DL
WBC # BLD AUTO: 13.7 X10^3/UL (ref 4–11)
WBC #/AREA URNS HPF: 0 /HPF (ref 0–4)

## 2021-05-03 PROCEDURE — 83880 ASSAY OF NATRIURETIC PEPTIDE: CPT

## 2021-05-03 PROCEDURE — 81001 URINALYSIS AUTO W/SCOPE: CPT

## 2021-05-03 PROCEDURE — 99285 EMERGENCY DEPT VISIT HI MDM: CPT

## 2021-05-03 PROCEDURE — 80053 COMPREHEN METABOLIC PANEL: CPT

## 2021-05-03 PROCEDURE — 96361 HYDRATE IV INFUSION ADD-ON: CPT

## 2021-05-03 PROCEDURE — 36415 COLL VENOUS BLD VENIPUNCTURE: CPT

## 2021-05-03 PROCEDURE — 87040 BLOOD CULTURE FOR BACTERIA: CPT

## 2021-05-03 PROCEDURE — 81025 URINE PREGNANCY TEST: CPT

## 2021-05-03 PROCEDURE — 71045 X-RAY EXAM CHEST 1 VIEW: CPT

## 2021-05-03 PROCEDURE — 83605 ASSAY OF LACTIC ACID: CPT

## 2021-05-03 PROCEDURE — 85025 COMPLETE CBC W/AUTO DIFF WBC: CPT

## 2021-05-03 PROCEDURE — 96374 THER/PROPH/DIAG INJ IV PUSH: CPT

## 2021-05-03 PROCEDURE — 93005 ELECTROCARDIOGRAM TRACING: CPT

## 2021-05-03 PROCEDURE — 84145 PROCALCITONIN (PCT): CPT

## 2021-05-03 NOTE — EKG
[facility]

              [address] [place] [address]

Test Date:    2021               Test Time:    14:21:20

Pat Name:     THERESA HERNANDEZ     Department:   

Patient ID:   [id number]           Room:          

Gender:       F                        Technician:   SC

:          1970               Requested By: ENRICO CANO

Order Number: [id number]           Reading MD:     

                                 Measurements

Intervals                              Axis          

Rate:         93                       P:            32

TN:           154                      QRS:          -12

QRSD:         86                       T:            13

QT:           326                                    

QTc:          408                                    

                           Interpretive Statements

SINUS RHYTHM

LEFT ATRIAL ABNORMALITY

LEFTWARD AXIS

ABNORMAL ECG

RI6.02

No previous ECG available for comparison

## 2021-05-03 NOTE — RAD
XR CHEST 1V



CLINICAL INDICATIONS: Shortness of air  



COMPARISON: April 26, 2021.



Findings: No acute lung infiltrate or pleural effusion or pulmonary edema or lung mass or pneumothora
x is seen.  The heart size, pulmonary vasculature, mediastinum and both laurie are unremarkable. 



IMPRESSION: No acute radiographic abnormality is seen.



Electronically signed by: Chaitanya Saavedra MD (5/3/2021 2:15 PM) [id number]

## 2021-05-03 NOTE — PHYS DOC
Past Medical History


Past Medical History:  Diabetes-Type II, Glaucoma, Hypertension, Other


Additional Past Medical Histor:  Detached retina; Visually impaired, SLEEP APNEA


Past Surgical History:  Hysterectomy, Tubal ligation, Other


Additional Past Surgical Histo:  Detached retina repair; Cauterization for 

vaginal bleeding, LEFT FOOT,


Smoking Status:  Never Smoker


Alcohol Use:  Occasionally


Drug Use:  None





General Adult


EDM:


Chief Complaint:  DYSPNEA/RESPIRATORY DISTRESS





HPI:


HPI:





Patient is a 50  year old female with history of diabetes type 2, hypertension, 

who presents to the ED today complaining of ongoing shortness of breath, 

fatigue, dehydration, patient states symptoms began 2 weeks ago.  Patient states

she was diagnosed with COVID-19 last week.  She states 2 weeks ago she was seen 

by the PCP, she was given amoxicillin with no improvement to her symptoms.  She 

came to the ED last week and was also seen and tested positive for COVID-19.  

She states she is not improving.  Denies any chest pain.  Denies any fever,





Review of Systems:


Review of Systems:


Constitutional: Reports fatigue denies fever or chills. []


Eyes:   Denies change in visual acuity. []


HENT:   Denies nasal congestion or sore throat. [] 


Respiratory: Reports shortness of breath denies cough 


Cardiovascular:   Denies chest pain or edema. [] 


GI:   Denies abdominal pain, nausea, vomiting, bloody stools or diarrhea. [] 


:  Denies dysuria. [] 


Musculoskeletal:   Denies back pain or joint pain. [] 


Integument:   Denies rash. [] 


Neurologic:   Denies headache, focal weakness or sensory changes. [] 


Psychiatric:  Denies depression or anxiety. []





Heart Score:


C/O Chest Pain:  N/A


Risk Factors:


Risk Factors:  DM, Current or recent (<one month) smoker, HTN, HLP, family 

history of CAD, obesity.


Risk Scores:


Score 0 - 3:  2.5% MACE over next 6 weeks - Discharge Home


Score 4 - 6:  20.3% MACE over next 6 weeks - Admit for Clinical Observation


Score 7 - 10:  72.7% MACE over next 6 weeks - Early Invasive Strategies





Current Medications:





Current Medications








 Medications


  (Trade)  Dose


 Ordered  Sig/Anila  Start Time


 Stop Time Status Last Admin


Dose Admin


 


 Dexamethasone


 Sodium Phosphate


  (Decadron)  10 mg  1X  ONCE  5/3/21 14:00


 5/3/21 14:03 DC 5/3/21 14:40


10 MG


 


 Sodium Chloride  1,000 ml @ 


 1,000 mls/hr  1X  ONCE  5/3/21 14:00


 5/3/21 14:59 DC 5/3/21 14:40


1,000 MLS/HR











Allergies:


Allergies:





Allergies








Coded Allergies Type Severity Reaction Last Updated Verified


 


  No Known Drug Allergies    6/10/14 No











Physical Exam:


PE:





Constitutional: Well developed, well nourished, no acute distress, non-toxic 

appearance. []


HENT: Normocephalic, atraumatic, bilateral external ears normal, oropharynx 

moist, no oral exudates, nose normal. []


Eyes: PERRLA, EOMI, conjunctiva normal, no discharge. [] 


Neck: Normal range of motion, no tenderness, supple, no stridor. [] 


Cardiovascular:Heart rate regular rhythm, no murmur []


Lungs & Thorax:  Bilateral breath sounds clear to auscultation []


Abdomen: Bowel sounds normal, soft, no tenderness, no masses, no pulsatile 

masses. [] 


Skin: Warm, dry, no erythema, no rash. [] 


Back: No tenderness, no CVA tenderness. [] 


Extremities: No tenderness, no cyanosis, no clubbing, ROM intact, no edema. [] 


Neurologic: Alert and oriented X 3, normal motor function, normal sensory 

function, no focal deficits noted. []


Psychologic: Affect normal, judgement normal, mood normal. []





Current Patient Data:


Labs:





                                Laboratory Tests








Test


 5/3/21


14:27


 


White Blood Count


 13.7 x10^3/uL


(4.0-11.0)  H


 


Red Blood Count


 5.19 x10^6/uL


(3.50-5.40)


 


Hemoglobin


 14.4 g/dL


(12.0-15.5)


 


Hematocrit


 43.8 %


(36.0-47.0)


 


Mean Corpuscular Volume


 84 fL ()





 


Mean Corpuscular Hemoglobin 28 pg (25-35)  


 


Mean Corpuscular Hemoglobin


Concent 33 g/dL


(31-37)


 


Red Cell Distribution Width


 13.5 %


(11.5-14.5)


 


Platelet Count


 308 x10^3/uL


(140-400)


 


Neutrophils (%) (Auto) 77 % (31-73)  H


 


Lymphocytes (%) (Auto) 16 % (24-48)  L


 


Monocytes (%) (Auto) 6 % (0-9)  


 


Eosinophils (%) (Auto) 1 % (0-3)  


 


Basophils (%) (Auto) 1 % (0-3)  


 


Neutrophils # (Auto)


 10.5 x10^3/uL


(1.8-7.7)  H


 


Lymphocytes # (Auto)


 2.1 x10^3/uL


(1.0-4.8)


 


Monocytes # (Auto)


 0.9 x10^3/uL


(0.0-1.1)


 


Eosinophils # (Auto)


 0.1 x10^3/uL


(0.0-0.7)


 


Basophils # (Auto)


 0.1 x10^3/uL


(0.0-0.2)


 


Sodium Level


 134 mmol/L


(136-145)  L


 


Potassium Level


 4.2 mmol/L


(3.5-5.1)


 


Chloride Level


 97 mmol/L


()  L


 


Carbon Dioxide Level


 32 mmol/L


(21-32)


 


Anion Gap 5 (6-14)  L


 


Blood Urea Nitrogen


 12 mg/dL


(7-20)


 


Creatinine


 0.8 mg/dL


(0.6-1.0)


 


Estimated GFR


(Cockcroft-Gault) 92.6  





 


BUN/Creatinine Ratio 15 (6-20)  


 


Glucose Level


 385 mg/dL


(70-99)  H


 


Lactic Acid Level


 1.6 mmol/L


(0.4-2.0)


 


Calcium Level


 9.6 mg/dL


(8.5-10.1)


 


Total Bilirubin


 0.5 mg/dL


(0.2-1.0)


 


Aspartate Amino Transferase


(AST) 12 U/L (15-37)


L


 


Alanine Aminotransferase (ALT)


 23 U/L (14-59)





 


Alkaline Phosphatase


 84 U/L


()


 


NT-Pro-B-Type Natriuretic


Peptide 10 pg/mL


(0-124)


 


Total Protein


 7.8 g/dL


(6.4-8.2)


 


Albumin


 3.4 g/dL


(3.4-5.0)


 


Albumin/Globulin Ratio


 0.8 (1.0-1.7)


L


 


Procalcitonin


 < 0.10 ng/mL


(0.00-0.10)





                                Laboratory Tests


5/3/21 14:27








                                Laboratory Tests


5/3/21 14:27











EKG:


EK interpreted by Dr. Miramontes sinus rhythm heart rate 93 no STEMI []





Radiology/Procedures:


Radiology/Procedures:


[]PROCEDURE: PORTABLE CHEST 1V





XR CHEST 1V





CLINICAL INDICATIONS: Shortness of air  





COMPARISON: 2021.





Findings: No acute lung infiltrate or pleural effusion or pulmonary edema or 

lung mass or pneumothorax is seen.  The heart size, pulmonary vasculature, 

mediastinum and both laurie are unremarkable. 





IMPRESSION: No acute radiographic abnormality is seen.





Electronically signed by: Chaitanya Saavedra MD (5/3/2021 2:15 PM) [id number]














DICTATED and SIGNED BY:     CHAITANYA SAAVEDRA MD


DATE:     21 [id number] 0





Course & Med Decision Making:


Course & Med Decision Making


Pertinent Labs and Imaging studies reviewed. (See chart for details)





This is a 50-year-old female patient presenting to the ED today with ongoing 

symptoms after being diagnosed with COVID-19 last week.  Patient is complaining 

of fatigue, shortness of breath, dehydration.





Chest x-ray is negative, CBC with WBC of 13.7, CMP with glucose of 385, patient 

has history of diabetes type 2.





Given IV fluids, discharge to home.  Educated on ongoing long-haul COVID-19 

symptoms





Vitals are stable.  Follow-up with PCP





Simeon Disclaimer:


Dragon Disclaimer:


This electronic medical record was generated, in whole or in part, using a voice

 recognition dictation system.





Departure


Departure


Impression:  


   Primary Impression:  


   Lab test positive for detection of COVID-19 virus


   Additional Impressions:  


   Shortness of breath


   Dehydration


   Hyperglycemia


Disposition:   HOME / SELF CARE / HOMELESS


Condition:  STABLE


Referrals:  


ARUN DANIELLE MD (PCP)


Follow-up in 1 to 2 weeks


Patient Instructions:  Dehydration, Adult, Shortness of Breath, Easy-to-Read





Additional Instructions:  


You were evaluated in the emergency room for symptoms consistent with COVID-19. 

 These symptoms can linger on up to 3 months.  Try and push fluids, rest, 

maintain good hand hygiene.  Continue to quarantine until you complete 10 days. 

 Follow-up with your doctor in 1 to 2 weeks











ENRICO CANO             May 3, 2021 16:34

## 2021-09-22 ENCOUNTER — HOSPITAL ENCOUNTER (EMERGENCY)
Dept: HOSPITAL 61 - ER | Age: 51
Discharge: HOME | End: 2021-09-22
Payer: MEDICARE

## 2021-09-22 VITALS — HEIGHT: 68 IN | WEIGHT: 260.37 LBS | BODY MASS INDEX: 39.46 KG/M2

## 2021-09-22 VITALS — DIASTOLIC BLOOD PRESSURE: 73 MMHG | SYSTOLIC BLOOD PRESSURE: 134 MMHG

## 2021-09-22 DIAGNOSIS — Z98.51: ICD-10-CM

## 2021-09-22 DIAGNOSIS — Z90.710: ICD-10-CM

## 2021-09-22 DIAGNOSIS — R11.2: ICD-10-CM

## 2021-09-22 DIAGNOSIS — E11.39: ICD-10-CM

## 2021-09-22 DIAGNOSIS — R10.10: Primary | ICD-10-CM

## 2021-09-22 DIAGNOSIS — Z20.822: ICD-10-CM

## 2021-09-22 DIAGNOSIS — H40.9: ICD-10-CM

## 2021-09-22 DIAGNOSIS — I10: ICD-10-CM

## 2021-09-22 LAB
ALBUMIN SERPL-MCNC: 3.5 G/DL (ref 3.4–5)
ALBUMIN/GLOB SERPL: 0.8 {RATIO} (ref 1–1.7)
ALP SERPL-CCNC: 92 U/L (ref 46–116)
ALT SERPL-CCNC: 18 U/L (ref 14–59)
ANION GAP SERPL CALC-SCNC: 8 MMOL/L (ref 6–14)
APTT PPP: YELLOW S
AST SERPL-CCNC: 13 U/L (ref 15–37)
BACTERIA #/AREA URNS HPF: 0 /HPF
BASOPHILS # BLD AUTO: 0 X10^3/UL (ref 0–0.2)
BASOPHILS NFR BLD: 0 % (ref 0–3)
BILIRUB SERPL-MCNC: 0.5 MG/DL (ref 0.2–1)
BILIRUB UR QL STRIP: NEGATIVE
BUN SERPL-MCNC: 9 MG/DL (ref 7–20)
BUN/CREAT SERPL: 11 (ref 6–20)
CALCIUM SERPL-MCNC: 9.5 MG/DL (ref 8.5–10.1)
CHLORIDE SERPL-SCNC: 98 MMOL/L (ref 98–107)
CO2 SERPL-SCNC: 32 MMOL/L (ref 21–32)
CREAT SERPL-MCNC: 0.8 MG/DL (ref 0.6–1)
EOSINOPHIL NFR BLD: 0.1 X10^3/UL (ref 0–0.7)
EOSINOPHIL NFR BLD: 1 % (ref 0–3)
ERYTHROCYTE [DISTWIDTH] IN BLOOD BY AUTOMATED COUNT: 14.3 % (ref 11.5–14.5)
FIBRINOGEN PPP-MCNC: CLEAR MG/DL
GFR SERPLBLD BASED ON 1.73 SQ M-ARVRAT: 92.2 ML/MIN
GLUCOSE SERPL-MCNC: 349 MG/DL (ref 70–99)
HCT VFR BLD CALC: 42.3 % (ref 36–47)
HGB BLD-MCNC: 14.2 G/DL (ref 12–15.5)
LYMPHOCYTES # BLD: 1 X10^3/UL (ref 1–4.8)
LYMPHOCYTES NFR BLD AUTO: 8 % (ref 24–48)
MCH RBC QN AUTO: 29 PG (ref 25–35)
MCHC RBC AUTO-ENTMCNC: 34 G/DL (ref 31–37)
MCV RBC AUTO: 86 FL (ref 79–100)
MONO #: 0.4 X10^3/UL (ref 0–1.1)
MONOCYTES NFR BLD: 4 % (ref 0–9)
NEUT #: 10 X10^3/UL (ref 1.8–7.7)
NEUTROPHILS NFR BLD AUTO: 87 % (ref 31–73)
NITRITE UR QL STRIP: NEGATIVE
PH UR STRIP: 5.5 [PH]
PLATELET # BLD AUTO: 273 X10^3/UL (ref 140–400)
POTASSIUM SERPL-SCNC: 3.9 MMOL/L (ref 3.5–5.1)
PROT SERPL-MCNC: 8 G/DL (ref 6.4–8.2)
PROT UR STRIP-MCNC: NEGATIVE MG/DL
RBC # BLD AUTO: 4.92 X10^6/UL (ref 3.5–5.4)
RBC #/AREA URNS HPF: (no result) /HPF (ref 0–2)
SODIUM SERPL-SCNC: 138 MMOL/L (ref 136–145)
UROBILINOGEN UR-MCNC: 1 MG/DL
WBC # BLD AUTO: 11.5 X10^3/UL (ref 4–11)
WBC #/AREA URNS HPF: 0 /HPF (ref 0–4)

## 2021-09-22 PROCEDURE — U0003 INFECTIOUS AGENT DETECTION BY NUCLEIC ACID (DNA OR RNA); SEVERE ACUTE RESPIRATORY SYNDROME CORONAVIRUS 2 (SARS-COV-2) (CORONAVIRUS DISEASE [COVID-19]), AMPLIFIED PROBE TECHNIQUE, MAKING USE OF HIGH THROUGHPUT TECHNOLOGIES AS DESCRIBED BY CMS-2020-01-R: HCPCS

## 2021-09-22 PROCEDURE — 81001 URINALYSIS AUTO W/SCOPE: CPT

## 2021-09-22 PROCEDURE — 96361 HYDRATE IV INFUSION ADD-ON: CPT

## 2021-09-22 PROCEDURE — 99285 EMERGENCY DEPT VISIT HI MDM: CPT

## 2021-09-22 PROCEDURE — 36415 COLL VENOUS BLD VENIPUNCTURE: CPT

## 2021-09-22 PROCEDURE — 87426 SARSCOV CORONAVIRUS AG IA: CPT

## 2021-09-22 PROCEDURE — 81025 URINE PREGNANCY TEST: CPT

## 2021-09-22 PROCEDURE — 84484 ASSAY OF TROPONIN QUANT: CPT

## 2021-09-22 PROCEDURE — 85025 COMPLETE CBC W/AUTO DIFF WBC: CPT

## 2021-09-22 PROCEDURE — 93005 ELECTROCARDIOGRAM TRACING: CPT

## 2021-09-22 PROCEDURE — 80053 COMPREHEN METABOLIC PANEL: CPT

## 2021-09-22 PROCEDURE — 74177 CT ABD & PELVIS W/CONTRAST: CPT

## 2021-09-22 PROCEDURE — 96374 THER/PROPH/DIAG INJ IV PUSH: CPT

## 2021-09-22 NOTE — EKG
[facility]

              [address] [place] [address]

Test Date:    2021               Test Time:    05:58:52

Pat Name:     THERESA HERNANDEZ     Department:   

Patient ID:   [id number]           Room:          

Gender:       F                        Technician:   

:          1970               Requested By: JOHN AQUINO

Order Number: [id number]           Reading MD:     

                                 Measurements

Intervals                              Axis          

Rate:         68                       P:            31

KS:           138                      QRS:          3

QRSD:         94                       T:            20

QT:           410                                    

QTc:          436                                    

                           Interpretive Statements

SINUS RHYTHM

QRS(T) CONTOUR ABNORMALITY

CONSIDER ANTEROSEPTAL MYOCARDIAL DAMAGE

POSSIBLY ABNORMAL ECG

RI6.01

No previous ECG available for comparison

## 2021-09-22 NOTE — PHYS DOC
Past Medical History


Past Medical History:  Diabetes-Type II, Glaucoma, Hypertension, Other


Additional Past Medical Histor:  Detached retina; Visually impaired, SLEEP 

APNEA; covid 19


 (JOHN AQUINO DO)


Past Surgical History:  Hysterectomy, Tubal ligation, Other


Additional Past Surgical Histo:  Detached retina repair; Cauterization for 

vaginal bleeding, LEFT FOOT,


 (JOHN AQUINO DO)


Smoking Status:  Never Smoker


Alcohol Use:  Occasionally


Drug Use:  None


 (JOHN AQUINO DO)





General Adult


EDM:


Chief Complaint:  NAUSEA/VOMITING/DIARRHEA





HPI:


HPI:





Patient is a 51  year old female past medical history hypertension 

hyperlipidemia diabetes presents with a chief complaint of abdominal discomfort.

 Patient states she woke up with abdominal discomfort and vomited around 0030 0 

hours.  Patient states abdominal pain is primarily in her upper abdomen.  

Patient states pain has been constant since onset.  Patient has associated 

nausea and vomited a total of 2 times.  Patient denies any associated diarrhea 

or urinary symptoms.  





Patient has been vaccinated against COVID-19.


 (JOHN AQUINO DO)





Review of Systems:


Constitutional symptoms-  No fever, no chills.


Eyes- No Discharge, No Visual Loss


Respiratory symptoms-  No shortness of breath, No wheezing, No Dyspnea on 

Exertion


Cardiovascular Systems; No chest pain, No Palpitations, No syncope


Gastrointestinal symptoms:  Positive abdominal pain, Positive nausea, Positive 

vomiting denies diarrhea.


Genitourinary symptoms:  No dysuria.  


Musculoskeletal symptoms:  No back pain  No extremity pain.


NEUROLOGICAL Symptoms:  No headache, no generalized weakness; No focal Weakness


Skin: No rash. 


 (JOHN AQUINO DO)





Heart Score:


C/O Chest Pain:  N/A


Risk Factors:


Risk Factors:  DM, Current or recent (<one month) smoker, HTN, HLP, family 

history of CAD, obesity.


Risk Scores:


Score 0 - 3:  2.5% MACE over next 6 weeks - Discharge Home


Score 4 - 6:  20.3% MACE over next 6 weeks - Admit for Clinical Observation


Score 7 - 10:  72.7% MACE over next 6 weeks - Early Invasive Strategies


 (JOHN AQUINO DO)


C/O Chest Pain:  No


 (NIMESH REY DO)





Allergies:


Allergies:





Allergies








Coded Allergies Type Severity Reaction Last Updated Verified


 


  No Known Drug Allergies    6/10/14 No








 (JOHN AQUINO DO)





Physical Exam:


PE:





Constitutional: Well developed, well nourished, no acute distress, non-toxic 

appearance. []


HENT: Normocephalic, atraumatic, bilateral external ears normal, oropharynx 

moist, no oral exudates, nose normal. []


Eyes: PERRLA, EOMI, conjunctiva normal, no discharge. [] 


Neck: Normal range of motion, no tenderness, supple, no stridor. [] 


Cardiovascular:Heart rate regular rhythm, no murmur []


Lungs & Thorax:  Bilateral breath sounds clear to auscultation []


Abdomen: Bowel sounds normal, soft, no tenderness, no masses, no pulsatile 

masses. [] 


Skin: Warm, dry, no erythema, no rash. [] 


Back: No tenderness, no CVA tenderness. [] 


Extremities: No tenderness, no cyanosis, no clubbing, ROM intact, no edema. [] 


Neurologic: Alert and oriented X 3, normal motor function, normal sensory 

function, no focal deficits noted. []


Psychologic: Affect normal, judgement normal, mood normal. []


 (JOHN AQUINO DO)





Current Patient Data:


Labs:





                                Laboratory Tests








Test


 21


04:01 21


04:11


 


Urine Collection Type Unknown   


 


Urine Color Yellow   


 


Urine Clarity Clear   


 


Urine pH


 5.5 (<5.0-8.0)


 





 


Urine Specific Gravity


 >=1.030


(1.000-1.030) 





 


Urine Protein


 Negative mg/dL


(NEG-TRACE) 





 


Urine Glucose (UA)


 >=1000 mg/dL


(NEG) 





 


Urine Ketones (Stick)


 Negative mg/dL


(NEG) 





 


Urine Blood


 Negative (NEG)


 





 


Urine Nitrite


 Negative (NEG)


 





 


Urine Bilirubin


 Negative (NEG)


 





 


Urine Urobilinogen Dipstick


 1.0 mg/dL (0.2


mg/dL) 





 


Urine Leukocyte Esterase


 Negative (NEG)


 





 


Urine RBC


 Occ /HPF (0-2)


 





 


Urine WBC 0 /HPF (0-4)   


 


Urine Squamous Epithelial


Cells Mod /LPF  


 





 


Urine Bacteria


 0 /HPF (0-FEW)


 





 


POC Urine HCG, Qualitative


 


 Hcg negative


(Negative)








Vital Signs:





                                   Vital Signs








  Date Time  Temp Pulse Resp B/P (MAP) Pulse Ox O2 Delivery O2 Flow Rate FiO2


 


21 03:45 97.6 69  134/77 (96) 97 Room Air  





 97.6       








 (JOHN AQUINO DO)





EKG:


EKG:





Performed at 0558


Rate 68


Normal sinus rhythm


No ST elevation


No ST depression


No acute MI


[]


 (JOHN AQUINO DO)





Radiology/Procedures:


Radiology/Procedures:


[]


 (JOHN AQUINO DO)


Radiology/Procedures:


                                 IMAGING REPORT





                                     Signed





PATIENT: THERESA HERNANDEZ LACCOUNT: [id number]     MRN#: [id number]


: 1970           LOCATION: ER              AGE: 51


SEX: F                    EXAM DT: 21         ACCESSION#: [id number]


STATUS: REG ER            ORD. PHYSICIAN: JOHN AQUINO DO


REASON: ABD PAIN;OMNI 300,75ML


PROCEDURE: CT ABD PELV W/ IV CONTRST ONLY





EXAM: CT ABDOMEN/PELVIS WITH CONTRAST.





HISTORY: Abdominal pain.





TECHNIQUE: Computed tomography of the abdomen and pelvis was performed after the

 intravenous administration of iodinated contrast. One or more of the following 

individualized dose reduction techniques were utilized for this examination:  


1. Automated exposure control.  


2. Adjustment of the mA and/or kV according to patient size.  


3. Use of iterative reconstruction technique.





COMPARISON: None.





FINDINGS: Lung windows through the visualized portions of the bases reveal mild 

atelectasis. Bone windows reveal no suspicious lesions.





The appendix is not inflamed. Stool throughout the colon is consistent with 

constipation. The uterus is surgically absent. There is no small bowel 

obstruction.





Fluid density just anterior to the capsule of hepatic segment 4B on image 22 

measures 2.2 x 0.9 cm and represents a benign cyst along the hepatic capsule. 

There are no suspicious hepatic lesions or clear hepatic surface nodularity. A 

variceal collateral extends from the splenic vein to the left renal vein. 





The gallbladder, pancreas, spleen, adrenal glands and kidneys are unremarkable. 

The splenic vein and portal vein remain patent. There are no pathologically 

enlarged lymph nodes





IMPRESSION: 


1. No cause for acute pain is identified.


2. 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 Correlate 

to exclude portal hypertension. No clear morphologic changes of cirrhosis.





Electronically signed by: ANTONIO Brantley MD (2021 6:50 AM) [place]














DICTATED and SIGNED BY:     CORAZON BRANTLEY MD


DATE:     21 [id number] 0


 ([facility])


Course & Med Decision Making:


Course & Med Decision Making


Pertinent Labs and Imaging studies reviewed. (See chart for details)





[] Patient was evaluated for chief complaint.  Work-up consisted of laboratory 

analysis and radiologic imaging EKG.


Results reviewed and discussed with patient.  Patient labs within normal limits 

with the exception of glucose being greater than 300.


Treatment included Pepcid and IV fluids.





CT imaging pending at shift change.





Patient to be signed out to Dr. Munoz pending CT imaging and reevaluation.





 (JOHN AQUINO I DO)


Course & Med Decision Making


Upon reevaluation patient sleeping, resting comfortably, hemodynamically stable 

with no tachycardia or persistent nausea or vomiting.  Patient reports relief of

 abdominal pain described as burning and constant that started around 3 AM.  

Reports she ate French toast sticks from [employer] before going to bed.  Labs reveal

 nonketotic uncontrolled diabetes.  Rapid Covid test is negative.  Denies any 

alcohol abuse.  No history of underlying cardiac disease, troponin negative and 

EKG reviewed myself is unremarkable (atypical chest pain considered,but is low 

suspicion).  CT abdomen pelvis concerning for hepatic cysts-report printed and 

given to patient to take to her primary care physician.  Patient reports she 

feels well to return home.  Will discharge home with strict ED return 

precautions were given for intractable nausea or vomiting, worsening pain, 

syncope or neurologic deficits. Encouraged urgent outpatient follow-up with PMD 

for reevaluation.  Life-threatening processes were considered but are low huang

spicion at this time, given history, physical exam and ED workup. Pt was 

educated on all prescription medications and adverse effects.  All patient's 

questions were answered and pt was stable at time of discharge.





Life/limb-threatening differential includes but is not limited to, acute 

coronary syndrome/myocardial infarction, Boerhaave's, DKA, gastrointestinal 

bleeding, intracranial hemorrhage, ischemic bowel, meningitis, sepsis, surgical 

abdomen (AAA), toxidrome (drug over/overdose/carbon monoxide, etc), ovarian 

torsion, trauma, or infection/sepsis.





I have spoken with the patient and/or caregivers.  I explained the patient's 

condition, diagnoses and treatment plan based on the information available to me

 at this time.  I have answered the patient and/or caregiver's questions and 

addressed any concerns.  The patient and/or caregivers have a good understanding

 of patient's diagnosis, condition and treatment plan as can be expected at this

 point.  Vital signs have been stable.  Patient's condition is stable and 

appropriate for discharge from the emergency department. 





Patient will pursue further outpatient evaluation with primary care physician or

 other designated or consulting physician as outlined in the discharge 

instructions.  The patient and/or caregivers are agreeable to this plan of care 

and follow-up instructions have been explained in detail.  The patient and/or 

caregivers have received these instructions in written form and have expressed 

an understanding of the discharge instructions.  The patient and/or caregivers 

are aware that any significant change of condition or worsening of symptoms 

should prompt immediate return to this or the closest emergency department or 

call to 911.


 (NIMESH MUNOZ DO)


Simeon Disclaimer:


Dragon Disclaimer:


This electronic medical record was generated, in whole or in part, using a voice

 recognition dictation system.


 (JOHN AQUINO DO)





Departure


Departure


Impression:  


   Primary Impression:  


   Person under investigation for COVID-19


   Additional Impressions:  


   Abdominal pain


   Nausea & vomiting


Disposition:  01 HOME / SELF CARE / HOMELESS


Condition:  STABLE


Referrals:  


ARUN DANIELLE MD (PCP)


Follow-up with your primary care physician in 24 to 48 hours OR


FOLLOW UP WITH FAMILY MEDICINE:


[address] [facility]


[place] [address]


Phone: [phone]


Patient Instructions:  Abdominal Migraine, Nausea and Vomiting





Additional Instructions:  


Return to ED immediately if your oxygen level drops below 90% (purchase a pulse 

oximetry at a medical supply store), difficulties breathing including rapid 

breathing or increased work of breathing (skin sucking under ribs), chest pain 

or stroke-like symptoms (facial droop, speech changes, arm/leg weakness).





You have been tested for or diagnosed with COVID-19. It is an infection caused 

by a new type 


of coronavirus. COVID-19 will cause cold-like or mild flu symptoms in most. It 

can cause 


more severe symptoms like problems breathing in some.





There is no treatment for COVID-19. The body will clear the infection over time.

 Self-care 


will help to ease discomfort.





Steps to Take:


Self-Care


Rest as needed. Healthy habits may help you feel better. Steps include:





Choose healthy foods including fruits and vegetables. Drink water throughout the

 day.


Get plenty of sleep each night.


If you smoke, try to quit. It may ease breathing.


Avoid alcohol.


Keep Others Healthy


The virus can spread to others. Droplets are released every time you sneeze or 

cough. The 


droplets can get into the mouth, nose, or eyes of people near you and lead to 

infection. To 


lower the chances of spreading COVID-19 to others:





Stay at home until your doctor has said it is safe to leave. If you tested pos

itive this 


will mean staying isolated until both of the following are true:





At least 7 days have passed since the start of illness.


You are free of fever for at least 72 hours without the use of medicine.


During this time:





 - Avoid public areas, events, or transportation. Do not return to work or 

school until your 


doctor has said it is safe to do so.


 - Call ahead if you need to go to a medical center. Let them know you may have 

COVID-19. It 


will help them guide you where to go. They may also ask you to wear a facemask 

when you come 


to the office.


 - If you call for emergency medical services, let them know you may have COVID-

19.


While at home:





 - Try to avoid close contact with others. Stay about 6 feet away.


 - If possible, spend most of your time in a separate room from others.


 - Use a face mask if you will be in close contact with others such as sharing a

 room or 


vehicle.


 - Have someone wipe down common surfaces in the home. Use household  

every day on 


areas like doorknobs, counters, or sinks.


 - Cough or sneeze into a tissue. Throw the tissue away right after use. If a 

tissue is not 


available, cough or sneeze into your elbow.


 - Wash your hands often. Wash them after sneezing or coughing. Use soap and 

water and wash 


for at least 20 seconds. Alcohol based hand  can be used if soap and 

water is not 


available.


 - Do not prepare food for others. Avoid sharing personal items like forks, 

spoons, or 


toothbrushes.


 - Avoid close contact with pets while you are sick. There is no evidence of the

 virus 


passing to pets. This is a safety step until more is known about this virus.


Isolation can be frustrating. Social interaction can help. Keep in touch with 

friends and 


family through phone and tech options. You can still interact with others in 

your home, just 


keep a safe distance of about 6 feet.





Follow-up:


Your doctors office will check in with you to see if there are any changes in 

your health. 


You may be asked to keep track of symptoms to share with them. They will also 

let you know 


when you are clear to be in public again.





Problems to Look Out For:


Contact your doctor if your recovery is not going as you expect. Get emergency 

care if you 


have problems such as:





 - Trouble breathing


 - Nonstop chest pain or pressure


 - Changes in awareness, confusion, or problems waking


 - Lips or face have bluish color


 - Worsening of symptoms


If you think you have an emergency, call for emergency medical services right 

away.





As taken from [employer]


Scripts


Ondansetron Hcl (ZOFRAN) 4 Mg Tablet


1 TAB PO Q6HRS, #20 TAB


   Prov: JOHN AQUINO I DO         21











JOHN AQUINO I DO            Sep 22, 2021 04:52


[facility]               Sep 22, 2021 07:16

## 2021-09-22 NOTE — RAD
EXAM: CT ABDOMEN/PELVIS WITH CONTRAST.



HISTORY: Abdominal pain.



TECHNIQUE: Computed tomography of the abdomen and pelvis was performed after the intravenous administ
ration of iodinated contrast. One or more of the following individualized dose reduction techniques w
ere utilized for this examination:  

1. Automated exposure control.  

2. Adjustment of the mA and/or kV according to patient size.  

3. Use of iterative reconstruction technique.



COMPARISON: None.



FINDINGS: Lung windows through the visualized portions of the bases reveal mild atelectasis. Bone win
dows reveal no suspicious lesions.



The appendix is not inflamed. Stool throughout the colon is consistent with constipation. The uterus 
is surgically absent. There is no small bowel obstruction.



Fluid density just anterior to the capsule of hepatic segment 4B on image 22 measures 2.2 x 0.9 cm an
d represents a benign cyst along the hepatic capsule. There are no suspicious hepatic lesions or yecenia
r hepatic surface nodularity. A variceal collateral extends from the splenic vein to the left renal v
ein. 



The gallbladder, pancreas, spleen, adrenal glands and kidneys are unremarkable. The splenic vein and 
portal vein remain patent. There are no pathologically enlarged lymph nodes



IMPRESSION: 

1. No cause for acute pain is identified.

2. 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 Correlate to exclude portal hype
rtension. No clear morphologic changes of cirrhosis.



Electronically signed by: ANTONIO Brantley MD (9/22/2021 6:50 AM) [facility]

## 2021-09-24 NOTE — NUR
IP: attempted to notify patient of negative COVID19 test results. Voicemail message to 
please call and phone number provide.